# Patient Record
Sex: MALE | Race: WHITE
[De-identification: names, ages, dates, MRNs, and addresses within clinical notes are randomized per-mention and may not be internally consistent; named-entity substitution may affect disease eponyms.]

---

## 2019-03-25 ENCOUNTER — HOSPITAL ENCOUNTER (OUTPATIENT)
Dept: HOSPITAL 75 - RAD | Age: 69
End: 2019-03-25
Attending: FAMILY MEDICINE
Payer: MEDICARE

## 2019-03-25 DIAGNOSIS — R05: Primary | ICD-10-CM

## 2019-03-25 PROCEDURE — 71046 X-RAY EXAM CHEST 2 VIEWS: CPT

## 2019-03-25 NOTE — DIAGNOSTIC IMAGING REPORT
Indication: Persistent cough



PA and lateral chest



Heart size and pulmonary vascular normal. Lungs are clear. There

are no effusions or pneumothoraces.



Impression: Negative chest



Dictated by: 



  Dictated on workstation # [id number]

## 2022-06-23 ENCOUNTER — HOSPITAL ENCOUNTER (OUTPATIENT)
Dept: HOSPITAL 75 - CARD | Age: 72
End: 2022-06-23
Attending: UROLOGY
Payer: MEDICARE

## 2022-06-23 DIAGNOSIS — N40.0: ICD-10-CM

## 2022-06-23 DIAGNOSIS — K57.30: ICD-10-CM

## 2022-06-23 DIAGNOSIS — K80.20: ICD-10-CM

## 2022-06-23 DIAGNOSIS — K76.0: Primary | ICD-10-CM

## 2022-06-23 DIAGNOSIS — Z85.46: ICD-10-CM

## 2022-06-23 PROCEDURE — 78306 BONE IMAGING WHOLE BODY: CPT

## 2022-06-23 PROCEDURE — 74176 CT ABD & PELVIS W/O CONTRAST: CPT

## 2022-06-23 NOTE — DIAGNOSTIC IMAGING REPORT
PROCEDURE: CT abdomen and pelvis without contrast.



TECHNIQUE: Multiple contiguous axial images were obtained through

the abdomen and pelvis without the use of intravenous contrast.

Auto Exposure Controls were utilized during the CT exam to meet

ALARA standards for radiation dose reduction. 



INDICATION:  Prostate carcinoma.



No prior studies are available for comparison.



FINDINGS: The lung bases are clear. There is some generalized low

attenuation throughout the liver consistent with hepatic

steatosis. No discrete liver mass is detected. There are multiple

stones within the gallbladder. No biliary ductal dilatation is

seen. Pancreas and spleen are unremarkable. No adrenal mass is

detected. No definite renal calculi or hydronephrosis is

identified. Aorta is nonaneurysmal. No central retroperitoneal or

mesenteric lymphadenopathy is detected. The small and large bowel

loops are normal caliber. There is no obstruction. There are

occasional diverticula within the sigmoid colon but no evidence

of acute diverticulitis. There is no free fluid or fluid

collection. Bladder is unremarkable. Prostate is enlarged. No

definite pathologically enlarged pelvic lymph nodes are

identified. Bony structures are without evidence of osteolytic or

blastic lesions. There is grade 1 spondylolisthesis of L5 on S1

with bilateral pars defects. There is generalized lumbar

spondylosis.



IMPRESSION:

1. Hepatic steatosis and cholelithiasis.

2. Prostatomegaly.

3. Uncomplicated diverticulosis.

4. No evidence of abdominal or pelvic lymphadenopathy or

metastatic disease.



Dictated by: 



  Dictated on workstation # [id number]

## 2022-06-23 NOTE — DIAGNOSTIC IMAGING REPORT
Indication: Prostate carcinoma.



Patient was administered 26.9 mCi technetium 99m MDP

intravenously and whole-body imaging was performed after a

three-hour delay.



No prior studies are available for comparison.



There is normal uptake of activity by the axial and appendicular

skeleton. There is uptake by the kidneys with excretion to

urinary bladder. No suspicious foci or trace accumulation is seen

to suggest osseous metastatic disease.



IMPRESSION: No scintigraphic evidence of osseous metastatic

disease.



Dictated by: 



  Dictated on workstation # [id number]

## 2022-07-11 ENCOUNTER — HOSPITAL ENCOUNTER (OUTPATIENT)
Dept: HOSPITAL 75 - ONC | Age: 72
LOS: 20 days | Discharge: HOME | End: 2022-07-31
Attending: RADIOLOGY
Payer: MEDICARE

## 2022-07-11 DIAGNOSIS — I10: ICD-10-CM

## 2022-07-11 DIAGNOSIS — E11.9: ICD-10-CM

## 2022-07-11 DIAGNOSIS — C61: Primary | ICD-10-CM

## 2022-07-11 PROCEDURE — 99204 OFFICE O/P NEW MOD 45 MIN: CPT

## 2022-07-11 PROCEDURE — 76873 ECHOGRAP TRANS R PROS STUDY: CPT

## 2022-08-18 ENCOUNTER — HOSPITAL ENCOUNTER (OUTPATIENT)
Dept: HOSPITAL 75 - PREOP | Age: 72
Discharge: HOME | End: 2022-08-18
Attending: UROLOGY
Payer: MEDICARE

## 2022-08-18 DIAGNOSIS — Z01.818: Primary | ICD-10-CM

## 2022-10-12 ENCOUNTER — HOSPITAL ENCOUNTER (OUTPATIENT)
Dept: HOSPITAL 75 - PREOP | Age: 72
LOS: 1 days | Discharge: HOME | End: 2022-10-13
Attending: UROLOGY
Payer: MEDICARE

## 2022-10-12 VITALS — WEIGHT: 176.37 LBS | BODY MASS INDEX: 27.04 KG/M2 | HEIGHT: 67.72 IN

## 2022-10-12 DIAGNOSIS — Z01.818: Primary | ICD-10-CM

## 2022-10-19 ENCOUNTER — HOSPITAL ENCOUNTER (OUTPATIENT)
Dept: HOSPITAL 75 - SDC | Age: 72
Discharge: HOME | End: 2022-10-19
Attending: UROLOGY
Payer: MEDICARE

## 2022-10-19 VITALS — SYSTOLIC BLOOD PRESSURE: 107 MMHG | DIASTOLIC BLOOD PRESSURE: 67 MMHG

## 2022-10-19 VITALS — SYSTOLIC BLOOD PRESSURE: 110 MMHG | DIASTOLIC BLOOD PRESSURE: 71 MMHG

## 2022-10-19 VITALS — DIASTOLIC BLOOD PRESSURE: 68 MMHG | SYSTOLIC BLOOD PRESSURE: 105 MMHG

## 2022-10-19 VITALS — SYSTOLIC BLOOD PRESSURE: 90 MMHG | DIASTOLIC BLOOD PRESSURE: 58 MMHG

## 2022-10-19 VITALS — DIASTOLIC BLOOD PRESSURE: 71 MMHG | SYSTOLIC BLOOD PRESSURE: 110 MMHG

## 2022-10-19 VITALS — SYSTOLIC BLOOD PRESSURE: 106 MMHG | DIASTOLIC BLOOD PRESSURE: 81 MMHG

## 2022-10-19 VITALS — SYSTOLIC BLOOD PRESSURE: 111 MMHG | DIASTOLIC BLOOD PRESSURE: 70 MMHG

## 2022-10-19 VITALS — DIASTOLIC BLOOD PRESSURE: 72 MMHG | SYSTOLIC BLOOD PRESSURE: 111 MMHG

## 2022-10-19 VITALS — WEIGHT: 176.37 LBS | BODY MASS INDEX: 27.04 KG/M2 | HEIGHT: 67.72 IN

## 2022-10-19 VITALS — SYSTOLIC BLOOD PRESSURE: 109 MMHG | DIASTOLIC BLOOD PRESSURE: 64 MMHG

## 2022-10-19 VITALS — SYSTOLIC BLOOD PRESSURE: 103 MMHG | DIASTOLIC BLOOD PRESSURE: 55 MMHG

## 2022-10-19 VITALS — DIASTOLIC BLOOD PRESSURE: 71 MMHG | SYSTOLIC BLOOD PRESSURE: 114 MMHG

## 2022-10-19 DIAGNOSIS — C61: Primary | ICD-10-CM

## 2022-10-19 PROCEDURE — 77470 SPECIAL RADIATION TREATMENT: CPT

## 2022-10-19 PROCEDURE — 77332 RADIATION TREATMENT AID(S): CPT

## 2022-10-19 PROCEDURE — 76965 ECHO GUIDANCE RADIOTHERAPY: CPT

## 2022-10-19 PROCEDURE — 55876 PLACE RT DEVICE/MARKER PROS: CPT

## 2022-10-19 PROCEDURE — 77778 APPLY INTERSTIT RADIAT COMPL: CPT

## 2022-10-19 PROCEDURE — 77290 THER RAD SIMULAJ FIELD CPLX: CPT

## 2022-10-19 PROCEDURE — 87081 CULTURE SCREEN ONLY: CPT

## 2022-10-19 PROCEDURE — 76000 FLUOROSCOPY <1 HR PHYS/QHP: CPT

## 2022-10-19 PROCEDURE — 93005 ELECTROCARDIOGRAM TRACING: CPT

## 2022-10-19 PROCEDURE — 82947 ASSAY GLUCOSE BLOOD QUANT: CPT

## 2022-10-19 PROCEDURE — 77318 BRACHYTX ISODOSE COMPLEX: CPT

## 2022-10-19 PROCEDURE — 77370 RADIATION PHYSICS CONSULT: CPT

## 2022-10-19 RX ADMIN — SODIUM CHLORIDE, SODIUM LACTATE, POTASSIUM CHLORIDE, AND CALCIUM CHLORIDE PRN MLS/HR: 600; 310; 30; 20 INJECTION, SOLUTION INTRAVENOUS at 08:00

## 2022-10-19 RX ADMIN — SODIUM CHLORIDE, SODIUM LACTATE, POTASSIUM CHLORIDE, AND CALCIUM CHLORIDE PRN MLS/HR: 600; 310; 30; 20 INJECTION, SOLUTION INTRAVENOUS at 06:43

## 2022-10-19 NOTE — DISCHARGE INST-UROLOGY
Discharge Inst-Urology


Reconcile Patient Problems


Problems Reviewed?:  Yes


Final Diagnosis


CA PROSTATE





Patient Instructions/Follow Up


Plan/Assessment/Instructions


Discharge with [facility] and leg bag day time and large bag night time with 

instructions





Come to office friday 9am to [facility]





Please make appointment to been seen in office by me in 2 weeks.





Keep bowels soft and moving





Increase oral fluids for 48 hours and then as needed.





Diet and Activity as tolerated.





If questions or concerns contact your physician 


Or seek help at emergency department.











TAWIL,ELIAS A MD               Oct 19, 2022 07:08

## 2022-10-19 NOTE — PROGRESS NOTE-POST OPERATIVE
Post-Operative Progess Note


Surgeon (s)/Assistant (s)


Surgeon


DUANE MYERS MD, ELIAS TAWIL MD


Assistant:  NONE





Pre-Operative Diagnosis


CA PROSTATE





Post-Operative Diagnosis





SAME





Procedure & Operative Findings


Date of Procedure


10/19/22


Procedure Performed/Findings


BRACHYTHERAPY, CYSTOGRAM, AND SPACE OAR


Anesthesia Type


GENERAL





Estimated Blood Loss


Estimated blood loss (mL):  NEGLIGIBLE





Specimens/Packing


Specimens Removed


NONE


Packing:  


NONE











TAWIL,ELIAS A MD               Oct 19, 2022 07:05

## 2022-10-19 NOTE — DIAGNOSTIC IMAGING REPORT
Indication: Fluoroscopy for brachytherapy.



Fluoroscopy was provided during brachytherapy. 11 seconds of

fluoroscopic time was utilized. A single image was obtained

demonstrating radiation seed implants within the prostate gland.



IMPRESSION: Fluoroscopy during brachytherapy.



Dictated by: 



  Dictated on workstation # [id number]

## 2022-10-19 NOTE — PROGRESS NOTE-PRE OPERATIVE
Pre-Operative Progress Note


Date of Available H&P:  Oct 19, 2022


Date H&P Reviewed:  Oct 19, 2022


Time H&P Reviewed:  07:04


Changes from last HP


NONE


Pre-Operative Diagnosis:  CA PROSTATE











TAWIL,ELIAS A MD               Oct 19, 2022 07:04

## 2022-10-19 NOTE — ANESTHESIA-GENERAL POST-OP
General


Patient Condition


Mental Status/LOC:  Same as Preop


Cardiovascular:  Satisfactory


Nausea/Vomiting:  Absent


Respiratory:  Satisfactory


Pain:  Controlled


Complications:  Absent





Post Op Complications


Complications


None





Follow Up Care/Instructions


Patient Instructions


None needed.





Anesthesia/Patient Condition


Patient Condition


Patient is doing well, no complaints, stable vital signs, no apparent adverse 

anesthesia problems.   


No complications reported per nursing.











MIGUEL SUNSHINE CRNA            Oct 19, 2022 08:53

## 2022-11-16 ENCOUNTER — HOSPITAL ENCOUNTER (OUTPATIENT)
Dept: HOSPITAL 75 - ONC | Age: 72
LOS: 14 days | Discharge: HOME | End: 2022-11-30
Attending: RADIOLOGY
Payer: MEDICARE

## 2022-11-16 DIAGNOSIS — E11.9: ICD-10-CM

## 2022-11-16 DIAGNOSIS — I10: ICD-10-CM

## 2022-11-16 DIAGNOSIS — C61: Primary | ICD-10-CM

## 2022-11-16 DIAGNOSIS — Z51.0: ICD-10-CM

## 2022-11-16 PROCEDURE — 77290 THER RAD SIMULAJ FIELD CPLX: CPT

## 2022-11-16 PROCEDURE — 77295 3-D RADIOTHERAPY PLAN: CPT

## 2023-04-20 ENCOUNTER — HOSPITAL ENCOUNTER (OUTPATIENT)
Dept: HOSPITAL 75 - ONC | Age: 73
LOS: 10 days | Discharge: HOME | End: 2023-04-30
Attending: INTERNAL MEDICINE
Payer: MEDICARE

## 2023-04-20 DIAGNOSIS — C61: Primary | ICD-10-CM

## 2023-04-20 DIAGNOSIS — I10: ICD-10-CM

## 2023-04-20 DIAGNOSIS — E11.9: ICD-10-CM

## 2023-04-20 PROCEDURE — 82330 ASSAY OF CALCIUM: CPT

## 2023-04-20 PROCEDURE — 82310 ASSAY OF CALCIUM: CPT

## 2023-04-20 PROCEDURE — 83970 ASSAY OF PARATHORMONE: CPT

## 2023-04-20 PROCEDURE — 99213 OFFICE O/P EST LOW 20 MIN: CPT
